# Patient Record
Sex: MALE | Race: WHITE | NOT HISPANIC OR LATINO | URBAN - METROPOLITAN AREA
[De-identification: names, ages, dates, MRNs, and addresses within clinical notes are randomized per-mention and may not be internally consistent; named-entity substitution may affect disease eponyms.]

---

## 2016-07-17 RX ORDER — ROSUVASTATIN CALCIUM 5 MG/1
0 TABLET ORAL
Qty: 90 | Refills: 0 | COMMUNITY
Start: 2016-07-17

## 2016-11-13 RX ORDER — METOPROLOL TARTRATE 50 MG
0 TABLET ORAL
Qty: 30 | Refills: 0 | COMMUNITY
Start: 2016-11-13

## 2017-08-03 RX ORDER — LOSARTAN POTASSIUM 100 MG/1
0 TABLET, FILM COATED ORAL
Qty: 30 | Refills: 0 | COMMUNITY
Start: 2017-08-03

## 2017-08-13 RX ORDER — LOSARTAN POTASSIUM 100 MG/1
0 TABLET, FILM COATED ORAL
Qty: 30 | Refills: 0 | COMMUNITY
Start: 2017-08-13

## 2017-08-20 ENCOUNTER — EMERGENCY (EMERGENCY)
Facility: HOSPITAL | Age: 62
LOS: 1 days | Discharge: HOME | End: 2017-08-20
Attending: EMERGENCY MEDICINE
Payer: COMMERCIAL

## 2017-08-20 VITALS
OXYGEN SATURATION: 98 % | RESPIRATION RATE: 16 BRPM | DIASTOLIC BLOOD PRESSURE: 78 MMHG | SYSTOLIC BLOOD PRESSURE: 135 MMHG | HEART RATE: 58 BPM | WEIGHT: 173.94 LBS | HEIGHT: 70 IN | TEMPERATURE: 98 F
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
## 2017-08-20 DIAGNOSIS — Z88.0 ALLERGY STATUS TO PENICILLIN: ICD-10-CM

## 2017-08-20 DIAGNOSIS — S51.011A LACERATION WITHOUT FOREIGN BODY OF RIGHT ELBOW, INITIAL ENCOUNTER: ICD-10-CM

## 2017-08-20 DIAGNOSIS — M70.21 OLECRANON BURSITIS, RIGHT ELBOW: ICD-10-CM
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
## 2017-08-20 PROCEDURE — 99283 EMERGENCY DEPT VISIT LOW MDM: CPT | Mod: 25

## 2017-08-20 PROCEDURE — 99284 EMERGENCY DEPT VISIT MOD MDM: CPT | Mod: 25

## 2017-08-20 PROCEDURE — 90715 TDAP VACCINE 7 YRS/> IM: CPT

## 2017-08-20 PROCEDURE — 73070 X-RAY EXAM OF ELBOW: CPT

## 2017-08-20 PROCEDURE — 73070 X-RAY EXAM OF ELBOW: CPT | Mod: 26,RT

## 2017-08-20 PROCEDURE — 90471 IMMUNIZATION ADMIN: CPT

## 2017-08-20 PROCEDURE — 12001 RPR S/N/AX/GEN/TRNK 2.5CM/<: CPT | Mod: LT

## 2017-08-20 PROCEDURE — 12001 RPR S/N/AX/GEN/TRNK 2.5CM/<: CPT

## 2017-08-20 RX ORDER — TETANUS TOXOID, REDUCED DIPHTHERIA TOXOID AND ACELLULAR PERTUSSIS VACCINE, ADSORBED 5; 2.5; 8; 8; 2.5 [IU]/.5ML; [IU]/.5ML; UG/.5ML; UG/.5ML; UG/.5ML
0.5 SUSPENSION INTRAMUSCULAR ONCE
Qty: 0 | Refills: 0 | Status: COMPLETED | OUTPATIENT
Start: 2017-08-20 | End: 2017-08-20

## 2017-08-20 RX ADMIN — TETANUS TOXOID, REDUCED DIPHTHERIA TOXOID AND ACELLULAR PERTUSSIS VACCINE, ADSORBED 0.5 MILLILITER(S): 5; 2.5; 8; 8; 2.5 SUSPENSION INTRAMUSCULAR at 15:25

## 2017-08-20 NOTE — ED PROVIDER NOTE - CARE PLAN
Principal Discharge DX:	Elbow laceration, right, initial encounter  Secondary Diagnosis:	Olecranon bursitis of right elbow

## 2017-08-20 NOTE — ED PROVIDER NOTE - OBJECTIVE STATEMENT
60 y/o male with no significant PMHx presents to the ED c/o R elbow laceration x today. Pt notes he was carrying his bicycle down the stairs when he tripped and fell, landing on his R elbow. Pt denies vomiting, LOC, weakness, numbness, tingling, or any other complaints. Pt also denies any other injuries. Tdap not UTD. Pt allergic to Penicillin (unknown).

## 2017-08-20 NOTE — ED ADULT NURSE NOTE - OBJECTIVE STATEMENT
pt from home c/o of Rt elbow pain with laceration s/p fell off stairs carrying a bike pt is alert awake oriented x3 Rt elbow positive ROM with small laceration and small bleeding at this time

## 2017-08-20 NOTE — ED PROVIDER NOTE - MEDICAL DECISION MAKING DETAILS
60 y/o male presents to the ED c/o R elbow laceration s/p mechanical fall x today.  PLAN: Xray, wound repair and update Tdap.

## 2017-08-20 NOTE — ED PROVIDER NOTE - MUSCULOSKELETAL, MLM
Spine appears normal, range of motion is not limited, no muscle or joint tenderness. Jaydon prominence palpated. Nontender except R elbow, full ROM

## 2017-08-26 ENCOUNTER — EMERGENCY (EMERGENCY)
Facility: HOSPITAL | Age: 62
LOS: 1 days | Discharge: HOME | End: 2017-08-26
Attending: EMERGENCY MEDICINE
Payer: COMMERCIAL

## 2017-08-26 VITALS
WEIGHT: 175.05 LBS | RESPIRATION RATE: 16 BRPM | OXYGEN SATURATION: 97 % | TEMPERATURE: 99 F | SYSTOLIC BLOOD PRESSURE: 128 MMHG | HEART RATE: 68 BPM | HEIGHT: 70 IN | DIASTOLIC BLOOD PRESSURE: 74 MMHG

## 2017-08-26 VITALS
SYSTOLIC BLOOD PRESSURE: 142 MMHG | OXYGEN SATURATION: 99 % | TEMPERATURE: 99 F | RESPIRATION RATE: 16 BRPM | HEART RATE: 71 BPM | DIASTOLIC BLOOD PRESSURE: 78 MMHG

## 2017-08-26 DIAGNOSIS — Z95.5 PRESENCE OF CORONARY ANGIOPLASTY IMPLANT AND GRAFT: Chronic | ICD-10-CM

## 2017-08-26 DIAGNOSIS — L02.413 CUTANEOUS ABSCESS OF RIGHT UPPER LIMB: ICD-10-CM

## 2017-08-26 DIAGNOSIS — Z88.0 ALLERGY STATUS TO PENICILLIN: ICD-10-CM

## 2017-08-26 DIAGNOSIS — I25.10 ATHEROSCLEROTIC HEART DISEASE OF NATIVE CORONARY ARTERY WITHOUT ANGINA PECTORIS: ICD-10-CM

## 2017-08-26 DIAGNOSIS — Z95.5 PRESENCE OF CORONARY ANGIOPLASTY IMPLANT AND GRAFT: ICD-10-CM

## 2017-08-26 PROCEDURE — 87205 SMEAR GRAM STAIN: CPT

## 2017-08-26 PROCEDURE — 87070 CULTURE OTHR SPECIMN AEROBIC: CPT

## 2017-08-26 PROCEDURE — 99284 EMERGENCY DEPT VISIT MOD MDM: CPT | Mod: 25

## 2017-08-26 PROCEDURE — 10060 I&D ABSCESS SIMPLE/SINGLE: CPT | Mod: RT

## 2017-08-26 PROCEDURE — 99283 EMERGENCY DEPT VISIT LOW MDM: CPT | Mod: 25

## 2017-08-26 PROCEDURE — 10060 I&D ABSCESS SIMPLE/SINGLE: CPT

## 2017-08-26 RX ADMIN — Medication 100 MILLIGRAM(S): at 16:02

## 2017-08-26 NOTE — ED PROVIDER NOTE - MEDICAL DECISION MAKING DETAILS
60 y/o M pt w/ R elbow swelling s/p suture placement 5 days ago. Will do I&D, suture removal, f/u w/ dermatology

## 2017-08-26 NOTE — ED ADULT TRIAGE NOTE - CHIEF COMPLAINT QUOTE
c/o right arm pain. " I had stiches placed in my elbow 5 days ago and now my arm is red, swollen and painful.

## 2017-08-26 NOTE — ED PROVIDER NOTE - OBJECTIVE STATEMENT
62 y/o M pt w/ PMHx of CAD w/ stents presents to ED c/o R elbow swelling x5 days. Pt states that he had 2 stitches placed in his R elbow 5 days ago in [facility]. Pt noticed some swelling w/ erythema, tenderness, fluctuance and mild induration to his R elbow. Pt denies fever, chills, or any other complaints. Pt has full ROM of his R elbow and is only c/o mild soreness. Pt is allergic to Penicillin (Unknown).

## 2017-08-27 ENCOUNTER — INPATIENT (INPATIENT)
Facility: HOSPITAL | Age: 62
LOS: 2 days | Discharge: HOME | DRG: 501 | End: 2017-08-30
Attending: INTERNAL MEDICINE | Admitting: INTERNAL MEDICINE
Payer: COMMERCIAL

## 2017-08-27 VITALS
WEIGHT: 175.05 LBS | HEART RATE: 98 BPM | OXYGEN SATURATION: 97 % | HEIGHT: 70 IN | TEMPERATURE: 98 F | RESPIRATION RATE: 17 BRPM | DIASTOLIC BLOOD PRESSURE: 86 MMHG | SYSTOLIC BLOOD PRESSURE: 144 MMHG

## 2017-08-27 DIAGNOSIS — Z29.9 ENCOUNTER FOR PROPHYLACTIC MEASURES, UNSPECIFIED: ICD-10-CM

## 2017-08-27 DIAGNOSIS — I10 ESSENTIAL (PRIMARY) HYPERTENSION: ICD-10-CM

## 2017-08-27 DIAGNOSIS — I25.10 ATHEROSCLEROTIC HEART DISEASE OF NATIVE CORONARY ARTERY WITHOUT ANGINA PECTORIS: ICD-10-CM

## 2017-08-27 DIAGNOSIS — N17.9 ACUTE KIDNEY FAILURE, UNSPECIFIED: ICD-10-CM

## 2017-08-27 DIAGNOSIS — L02.91 CUTANEOUS ABSCESS, UNSPECIFIED: ICD-10-CM

## 2017-08-27 DIAGNOSIS — Z95.5 PRESENCE OF CORONARY ANGIOPLASTY IMPLANT AND GRAFT: Chronic | ICD-10-CM

## 2017-08-27 LAB
ALBUMIN SERPL ELPH-MCNC: 3.5 G/DL — SIGNIFICANT CHANGE UP (ref 3.5–5)
ALP SERPL-CCNC: 79 U/L — SIGNIFICANT CHANGE UP (ref 40–120)
ALT FLD-CCNC: 35 U/L DA — SIGNIFICANT CHANGE UP (ref 10–60)
ANION GAP SERPL CALC-SCNC: 8 MMOL/L — SIGNIFICANT CHANGE UP (ref 5–17)
APTT BLD: 30.9 SEC — SIGNIFICANT CHANGE UP (ref 27.5–37.4)
AST SERPL-CCNC: 21 U/L — SIGNIFICANT CHANGE UP (ref 10–40)
BASOPHILS # BLD AUTO: 0.1 K/UL — SIGNIFICANT CHANGE UP (ref 0–0.2)
BASOPHILS NFR BLD AUTO: 1.1 % — SIGNIFICANT CHANGE UP (ref 0–2)
BILIRUB SERPL-MCNC: 0.5 MG/DL — SIGNIFICANT CHANGE UP (ref 0.2–1.2)
BUN SERPL-MCNC: 18 MG/DL — SIGNIFICANT CHANGE UP (ref 7–18)
CALCIUM SERPL-MCNC: 8.9 MG/DL — SIGNIFICANT CHANGE UP (ref 8.4–10.5)
CHLORIDE SERPL-SCNC: 110 MMOL/L — HIGH (ref 96–108)
CO2 SERPL-SCNC: 26 MMOL/L — SIGNIFICANT CHANGE UP (ref 22–31)
CREAT SERPL-MCNC: 1.42 MG/DL — HIGH (ref 0.5–1.3)
CRP SERPL-MCNC: 9.8 MG/DL — HIGH (ref 0–0.4)
EOSINOPHIL # BLD AUTO: 0.2 K/UL — SIGNIFICANT CHANGE UP (ref 0–0.5)
EOSINOPHIL NFR BLD AUTO: 1.6 % — SIGNIFICANT CHANGE UP (ref 0–6)
ERYTHROCYTE [SEDIMENTATION RATE] IN BLOOD: 39 MM/HR — HIGH (ref 0–20)
GLUCOSE SERPL-MCNC: 94 MG/DL — SIGNIFICANT CHANGE UP (ref 70–99)
HCT VFR BLD CALC: 43.6 % — SIGNIFICANT CHANGE UP (ref 39–50)
HGB BLD-MCNC: 13.9 G/DL — SIGNIFICANT CHANGE UP (ref 13–17)
INR BLD: 1.13 RATIO — SIGNIFICANT CHANGE UP (ref 0.88–1.16)
LACTATE SERPL-SCNC: 0.7 MMOL/L — SIGNIFICANT CHANGE UP (ref 0.7–2)
LYMPHOCYTES # BLD AUTO: 1.7 K/UL — SIGNIFICANT CHANGE UP (ref 1–3.3)
LYMPHOCYTES # BLD AUTO: 15.4 % — SIGNIFICANT CHANGE UP (ref 13–44)
MCHC RBC-ENTMCNC: 29.3 PG — SIGNIFICANT CHANGE UP (ref 27–34)
MCHC RBC-ENTMCNC: 31.9 GM/DL — LOW (ref 32–36)
MCV RBC AUTO: 92 FL — SIGNIFICANT CHANGE UP (ref 80–100)
MONOCYTES # BLD AUTO: 1.3 K/UL — HIGH (ref 0–0.9)
MONOCYTES NFR BLD AUTO: 11.5 % — SIGNIFICANT CHANGE UP (ref 2–14)
NEUTROPHILS # BLD AUTO: 7.9 K/UL — HIGH (ref 1.8–7.4)
NEUTROPHILS NFR BLD AUTO: 70.4 % — SIGNIFICANT CHANGE UP (ref 43–77)
PLATELET # BLD AUTO: 183 K/UL — SIGNIFICANT CHANGE UP (ref 150–400)
POTASSIUM SERPL-MCNC: 3.8 MMOL/L — SIGNIFICANT CHANGE UP (ref 3.5–5.3)
POTASSIUM SERPL-SCNC: 3.8 MMOL/L — SIGNIFICANT CHANGE UP (ref 3.5–5.3)
PROT SERPL-MCNC: 7.6 G/DL — SIGNIFICANT CHANGE UP (ref 6–8.3)
PROTHROM AB SERPL-ACNC: 12.4 SEC — SIGNIFICANT CHANGE UP (ref 9.8–12.7)
RBC # BLD: 4.75 M/UL — SIGNIFICANT CHANGE UP (ref 4.2–5.8)
RBC # FLD: 12.2 % — SIGNIFICANT CHANGE UP (ref 10.3–14.5)
SODIUM SERPL-SCNC: 144 MMOL/L — SIGNIFICANT CHANGE UP (ref 135–145)
WBC # BLD: 11.2 K/UL — HIGH (ref 3.8–10.5)
WBC # FLD AUTO: 11.2 K/UL — HIGH (ref 3.8–10.5)

## 2017-08-27 PROCEDURE — 99285 EMERGENCY DEPT VISIT HI MDM: CPT

## 2017-08-27 PROCEDURE — 71010: CPT | Mod: 26

## 2017-08-27 RX ORDER — LOSARTAN POTASSIUM 100 MG/1
75 TABLET, FILM COATED ORAL AT BEDTIME
Qty: 0 | Refills: 0 | Status: DISCONTINUED | OUTPATIENT
Start: 2017-08-27 | End: 2017-08-30

## 2017-08-27 RX ORDER — METOPROLOL TARTRATE 50 MG
25 TABLET ORAL DAILY
Qty: 0 | Refills: 0 | Status: DISCONTINUED | OUTPATIENT
Start: 2017-08-27 | End: 2017-08-30

## 2017-08-27 RX ORDER — LOSARTAN POTASSIUM 100 MG/1
75 TABLET, FILM COATED ORAL DAILY
Qty: 0 | Refills: 0 | Status: DISCONTINUED | OUTPATIENT
Start: 2017-08-27 | End: 2017-08-27

## 2017-08-27 RX ORDER — VANCOMYCIN HCL 1 G
1000 VIAL (EA) INTRAVENOUS ONCE
Qty: 0 | Refills: 0 | Status: COMPLETED | OUTPATIENT
Start: 2017-08-27 | End: 2017-08-27

## 2017-08-27 RX ORDER — SODIUM CHLORIDE 9 MG/ML
1000 INJECTION, SOLUTION INTRAVENOUS
Qty: 0 | Refills: 0 | Status: DISCONTINUED | OUTPATIENT
Start: 2017-08-27 | End: 2017-08-30

## 2017-08-27 RX ORDER — VANCOMYCIN HCL 1 G
1000 VIAL (EA) INTRAVENOUS EVERY 12 HOURS
Qty: 0 | Refills: 0 | Status: DISCONTINUED | OUTPATIENT
Start: 2017-08-27 | End: 2017-08-28

## 2017-08-27 RX ORDER — ATORVASTATIN CALCIUM 80 MG/1
80 TABLET, FILM COATED ORAL AT BEDTIME
Qty: 0 | Refills: 0 | Status: DISCONTINUED | OUTPATIENT
Start: 2017-08-27 | End: 2017-08-30

## 2017-08-27 RX ORDER — AZTREONAM 2 G
2000 VIAL (EA) INJECTION EVERY 8 HOURS
Qty: 0 | Refills: 0 | Status: DISCONTINUED | OUTPATIENT
Start: 2017-08-28 | End: 2017-08-29

## 2017-08-27 RX ORDER — AZTREONAM 2 G
2000 VIAL (EA) INJECTION ONCE
Qty: 0 | Refills: 0 | Status: COMPLETED | OUTPATIENT
Start: 2017-08-27 | End: 2017-08-28

## 2017-08-27 RX ORDER — AZTREONAM 2 G
VIAL (EA) INJECTION
Qty: 0 | Refills: 0 | Status: DISCONTINUED | OUTPATIENT
Start: 2017-08-28 | End: 2017-08-29

## 2017-08-27 RX ADMIN — Medication 250 MILLIGRAM(S): at 18:25

## 2017-08-27 RX ADMIN — SODIUM CHLORIDE 60 MILLILITER(S): 9 INJECTION, SOLUTION INTRAVENOUS at 22:17

## 2017-08-27 NOTE — H&P ADULT - NSHPLABSRESULTS_GEN_ALL_CORE
EKG shows sinus bradycardia at rate of 59 bpm, slight ST depression of around 0.5 mm in inferior leads but not diagnostic for ischemia  WBC mildly elevated at 11.2  ESR at 39 bpm, CRP 9.8  H/H wnl  Lytes wnl  Cr 1.42 - unknown baseline but presumed elevated

## 2017-08-27 NOTE — CONSULT NOTE ADULT - SUBJECTIVE AND OBJECTIVE BOX
HPI: Patient is a 60 y/o Male complaining of pain, swelling and redness in right elbow as well as his arm and forearm area.  	  [place], had sutures placed. Patient was seen yesterday in ED due to pain and swelling at elbow. An aspiration of elbow abscess was done and marcelle pus was noted and sent to culture. Patient was sent home on doxy (pt wi pcn allergy) overnight. Patient noted fever and chills since yesterday till now with pain, redness and swelling from finger tips to axilla. 	    PAST MEDICAL & SURGICAL HISTORY:  CAD (coronary artery disease)  Stented coronary artery    Review of systems: Non Contributory    Allergies: Penicillin     Vital Signs Last 24 Hrs  T(C): 36.7 (27 Aug 2017 17:06), Max: 36.7 (27 Aug 2017 17:06)  T(F): 98 (27 Aug 2017 17:06), Max: 98 (27 Aug 2017 17:06)  HR: 98 (27 Aug 2017 17:06) (98 - 98)  BP: 144/86 (27 Aug 2017 17:06) (144/86 - 144/86)  BP(mean): --  RR: 17 (27 Aug 2017 17:06) (17 - 17)  SpO2: 97% (27 Aug 2017 17:06) (97% - 97%)    Physical Examination:    Musculoskeletal:   Right elbow, forearm, arm area: Erythema and swelling, tenderness to palpation. Palpable abscess in right olecranon area. Decreased range of motion.     Neurovascularly Intact    LABS:                        13.9   11.2  )-----------( 183      ( 27 Aug 2017 17:57 )             43.6     08-27    144  |  110<H>  |  18  ----------------------------<  94  3.8   |  26  |  1.42<H>    Ca    8.9      27 Aug 2017 17:57    TPro  7.6  /  Alb  3.5  /  TBili  0.5  /  DBili  x   /  AST  21  /  ALT  35  /  AlkPhos  79  08-27    PT/INR - ( 27 Aug 2017 18:36 )   PT: 12.4 sec;   INR: 1.13 ratio         PTT - ( 27 Aug 2017 18:36 )  PTT:30.9 sec    RADIOLOGY & ADDITIONAL STUDIES:  Xray Elbow AP + Lateral, Right (08.20.17 @ 15:24)>    EXAM:  ELBOW 2VIEWS RT                          PROCEDURE DATE:  08/20/2017      INTERPRETATION:  History: Fall. Elbow tender to palpation. Laceration.    Findings: Frontal, lateral and oblique projections of the right elbow.    Apparent skinlaceration dorsal elbow noted on the lateral projection. No   evidence of elbow joint effusion. No fracture or dislocation appreciated.    Impression:    No fracture or dislocation right elbow appreciated.      DEVEN CORONADO M.D. ATTENDING RADIOLOGIST  This document has been electronically signed. Aug 20 2017  3:26PM        ASSESSMENT: Right elbow olecranon abscess with cellulitis of forearm.    PLAN/RECOMMENDATION: Patient to undergo I & D of Abscess and excision of infected olecranon bursa once medically cleared for procedure.     Continue IV AB    FOLLOW UP: HPI: Patient is a 60 y/o Male complaining of pain, swelling and redness in right elbow as well as his arm and forearm area.  	  [place], had sutures placed. Patient was seen yesterday in ED due to pain and swelling at elbow. An aspiration of elbow abscess was done and marcelle pus was noted and sent to culture. Patient was sent home on doxy (pt wi pcn allergy) overnight. Patient noted fever and chills since yesterday till now with pain, redness and swelling from finger tips to axilla. 	    PAST MEDICAL & SURGICAL HISTORY:  CAD (coronary artery disease)  Stented coronary artery    Review of systems: Non Contributory    Allergies: Penicillin     Vital Signs Last 24 Hrs  T(C): 36.7 (27 Aug 2017 17:06), Max: 36.7 (27 Aug 2017 17:06)  T(F): 98 (27 Aug 2017 17:06), Max: 98 (27 Aug 2017 17:06)  HR: 98 (27 Aug 2017 17:06) (98 - 98)  BP: 144/86 (27 Aug 2017 17:06) (144/86 - 144/86)  BP(mean): --  RR: 17 (27 Aug 2017 17:06) (17 - 17)  SpO2: 97% (27 Aug 2017 17:06) (97% - 97%)    Physical Examination:    Musculoskeletal:   Right elbow, forearm, arm area: Erythema and swelling, tenderness to palpation. Palpable abscess in right olecranon area. Decreased range of motion.     Neurovascularly Intact    LABS:                        13.9   11.2  )-----------( 183      ( 27 Aug 2017 17:57 )             43.6     08-27    144  |  110<H>  |  18  ----------------------------<  94  3.8   |  26  |  1.42<H>    Ca    8.9      27 Aug 2017 17:57    TPro  7.6  /  Alb  3.5  /  TBili  0.5  /  DBili  x   /  AST  21  /  ALT  35  /  AlkPhos  79  08-27    PT/INR - ( 27 Aug 2017 18:36 )   PT: 12.4 sec;   INR: 1.13 ratio         PTT - ( 27 Aug 2017 18:36 )  PTT:30.9 sec    RADIOLOGY & ADDITIONAL STUDIES:  Xray Elbow AP + Lateral, Right (08.20.17 @ 15:24)>    EXAM:  ELBOW 2VIEWS RT                          PROCEDURE DATE:  08/20/2017      INTERPRETATION:  History: Fall. Elbow tender to palpation. Laceration.    Findings: Frontal, lateral and oblique projections of the right elbow.    Apparent skinlaceration dorsal elbow noted on the lateral projection. No   evidence of elbow joint effusion. No fracture or dislocation appreciated.    Impression:    No fracture or dislocation right elbow appreciated.      DEVEN CORONADO M.D. ATTENDING RADIOLOGIST  This document has been electronically signed. Aug 20 2017  3:26PM        ASSESSMENT: Right elbow olecranon abscess with cellulitis of forearm.    PLAN/RECOMMENDATION: Patient to undergo I & D of Abscess and excision of infected olecranon bursa once medically cleared for procedure.     Continue IV AB    Posterior splint was applied.     FOLLOW UP:

## 2017-08-27 NOTE — H&P ADULT - PROBLEM SELECTOR PLAN 4
C/w home losartan and toprol with parameters  -monitor BP and adjust as necessary to improve bp control

## 2017-08-27 NOTE — H&P ADULT - PROBLEM SELECTOR PLAN 1
Will need antibiotic therapy  -will put on vancomycin and azactam for GP and GN coverage since [place]  -Dr. Cortez consulted for ortho drainage of infection - still pending to see if they need clearance  -will give LR hydration for infection  -ID consult Dr. Hurtado  -cultures taken during previous I&D yesterday are negative so far, f/u BCx and Surg Cx Will need antibiotic therapy  -will put on vancomycin and azactam for GP and GN coverage since [place]  -Dr. Cortez consulted for ortho drainage of infection - still pending to see if they need clearance  -will give LR hydration for infection  -ID consult Dr. Hurtado  -cultures taken during previous I&D yesterday are negative so far, f/u BCx and Surg Cx  -Due to elevated CRP/ESR, will get MRI of right elbow for evaluation of osteo Will need antibiotic therapy  -will put on vancomycin and azactam for GP and GN coverage since [place]  -Dr. Cortez consulted for ortho drainage of infection - still pending to see if they need clearance  -will give LR hydration for infection  -ID consult Dr. Bowman  -cultures taken during previous I&D yesterday are negative so far, f/u BCx and Surg Cx  -Due to elevated CRP/ESR, will get MRI of right elbow for evaluation of osteo

## 2017-08-27 NOTE — H&P ADULT - NSHPREVIEWOFSYSTEMS_GEN_ALL_CORE
REVIEW OF SYSTEMS:  CONSTITUTIONAL: No fever, weight loss, or fatigue  EYES: No eye pain, visual disturbances, or discharge  ENMT:  No difficulty hearing, tinnitus, vertigo; No sinus or throat pain  NECK: No pain or stiffness  RESPIRATORY: No cough, wheezing, chills or hemoptysis; No shortness of breath  CARDIOVASCULAR: No chest pain, palpitations, dizziness, or leg swelling  GASTROINTESTINAL: No abdominal or epigastric pain. No nausea, vomiting, or hematemesis; No diarrhea or constipation. No melena or hematochezia.  GENITOURINARY: No dysuria, frequency, hematuria, or incontinence  NEUROLOGICAL: No headaches, memory loss, loss of strength, numbness, or tremors  SKIN: No itching, burning, rashes, or lesions   LYMPH NODES: No enlarged glands  ENDOCRINE: No heat or cold intolerance; No hair loss  MUSCULOSKELETAL: right elbow pain, erythema and swelling; swelling moved from elbow up to shoulder and down to hand which is also profoundly swollen compared to the left  PSYCHIATRIC: No depression, anxiety, mood swings, or difficulty sleeping  HEME/LYMPH: No easy bruising, or bleeding gums  ALLERY AND IMMUNOLOGIC: No hives or eczema

## 2017-08-27 NOTE — ED ADULT NURSE REASSESSMENT NOTE - NS ED NURSE REASSESS COMMENT FT1
received  pt. from main ed at 2100 from aubrie sheehan. pt. is alert and  oriented x3.denies pain. NPO  maintained for I&D abscess and escision of infected elbow.transfer to surgery at 2320 not  in  distress

## 2017-08-27 NOTE — H&P ADULT - PROBLEM SELECTOR PLAN 2
Patient has RADHA presumably secondary to pre-renal azotemia from losses due to infection  -will hydrate with LR  -f/u AM renal panel repeat

## 2017-08-27 NOTE — H&P ADULT - HISTORY OF PRESENT ILLNESS
61M from home PMH of CAD with 3 stents placed 2 years ago, HTN, HLD presents to hospital with worsening right elbow pain and swelling. Last week (around monday), patient was [place] his [place] [employer] when his foot slipped and he fell and landed on his elbow. He went to the ED at that time and got 2 stitches placed into the elbow. Afterwards, last Friday, he noticed that it was bright red and swollen so he came to the ED the following day. On Saturday, patient was seen in the ED and got a bedside I&D by the ED and sent home on doxycycline. Today, he noticed that the swelling got worse and the erythema extended and wife took his temp at home and it was 99.7 so she told him to come to the ED again.

## 2017-08-27 NOTE — H&P ADULT - NSHPPHYSICALEXAM_GEN_ALL_CORE
Vital Signs Last 24 Hrs  T(C): 36.7 (27 Aug 2017 20:14), Max: 36.7 (27 Aug 2017 17:06)  T(F): 98.1 (27 Aug 2017 20:14), Max: 98.1 (27 Aug 2017 20:14)  HR: 57 (27 Aug 2017 20:14) (57 - 98)  BP: 170/88 (27 Aug 2017 20:14) (144/86 - 170/88)  BP(mean): --  RR: 18 (27 Aug 2017 20:14) (17 - 18)  SpO2: 100% (27 Aug 2017 20:14) (97% - 100%)    GENERAL: NAD, well-groomed, well-developed  HEAD:  Atraumatic, Normocephalic  EYES: EOMI, PERRLA, conjunctiva and sclera clear  ENMT: No tonsillar erythema, exudates, or enlargement; Moist mucous membranes  NECK: Supple, No JVD, Normal thyroid  NERVOUS SYSTEM:  Alert & Oriented X3  CHEST/LUNG: Clear to auscultation bilaterally; No rales, rhonchi, wheezing, or rubs  HEART: Regular rate and rhythm; No murmurs, rubs, or gallops  ABDOMEN: Soft, Nontender, Nondistended; Bowel sounds present  EXTREMITIES:  2+ Peripheral Pulses, No clubbing, cyanosis, or edema in legs. Right arm has erythema from upper arm to hand. Golf-ball like abscess palpated in the right olecranon. Right hand demonstrates significant edema. Elbow is tender to palpation.   LYMPH: No lymphadenopathy noted  SKIN: No rashes or lesions

## 2017-08-27 NOTE — H&P ADULT - ASSESSMENT
61M from home PMH of CAD with 3 stents placed 2 years ago, HTN, HLD presents to hospital with worsening right elbow pain and swelling. Admitted for worsening abscess of the right olecranon compared to 1 day prior which will need further antibiotic and surgical intervention.

## 2017-08-27 NOTE — H&P ADULT - PROBLEM SELECTOR PLAN 3
s/p 3 stents about 2 years ago at [facility]  -c/w ASA, Toprol and Statin (home crestor changed to formulary lipitor here)  -if surg decides that they need clearance, will consult cardio Dr. Kent  -will order echo for evaluation

## 2017-08-28 LAB
ANION GAP SERPL CALC-SCNC: 8 MMOL/L — SIGNIFICANT CHANGE UP (ref 5–17)
BUN SERPL-MCNC: 11 MG/DL — SIGNIFICANT CHANGE UP (ref 7–18)
CALCIUM SERPL-MCNC: 8.9 MG/DL — SIGNIFICANT CHANGE UP (ref 8.4–10.5)
CHLORIDE SERPL-SCNC: 110 MMOL/L — HIGH (ref 96–108)
CHOLEST SERPL-MCNC: 114 MG/DL — SIGNIFICANT CHANGE UP (ref 10–199)
CO2 SERPL-SCNC: 25 MMOL/L — SIGNIFICANT CHANGE UP (ref 22–31)
CREAT SERPL-MCNC: 1.03 MG/DL — SIGNIFICANT CHANGE UP (ref 0.5–1.3)
FOLATE SERPL-MCNC: >20 NG/ML — SIGNIFICANT CHANGE UP (ref 4.8–24.2)
GLUCOSE SERPL-MCNC: 99 MG/DL — SIGNIFICANT CHANGE UP (ref 70–99)
GRAM STN FLD: SIGNIFICANT CHANGE UP
HBA1C BLD-MCNC: 5.4 % — SIGNIFICANT CHANGE UP (ref 4–5.6)
HCT VFR BLD CALC: 43.6 % — SIGNIFICANT CHANGE UP (ref 39–50)
HDLC SERPL-MCNC: 46 MG/DL — SIGNIFICANT CHANGE UP (ref 40–125)
HGB BLD-MCNC: 14 G/DL — SIGNIFICANT CHANGE UP (ref 13–17)
LIPID PNL WITH DIRECT LDL SERPL: 58 MG/DL — SIGNIFICANT CHANGE UP
MAGNESIUM SERPL-MCNC: 2.3 MG/DL — SIGNIFICANT CHANGE UP (ref 1.6–2.6)
MCHC RBC-ENTMCNC: 29.4 PG — SIGNIFICANT CHANGE UP (ref 27–34)
MCHC RBC-ENTMCNC: 32.2 GM/DL — SIGNIFICANT CHANGE UP (ref 32–36)
MCV RBC AUTO: 91.3 FL — SIGNIFICANT CHANGE UP (ref 80–100)
PHOSPHATE SERPL-MCNC: 2.9 MG/DL — SIGNIFICANT CHANGE UP (ref 2.5–4.5)
PLATELET # BLD AUTO: 172 K/UL — SIGNIFICANT CHANGE UP (ref 150–400)
POTASSIUM SERPL-MCNC: 4 MMOL/L — SIGNIFICANT CHANGE UP (ref 3.5–5.3)
POTASSIUM SERPL-SCNC: 4 MMOL/L — SIGNIFICANT CHANGE UP (ref 3.5–5.3)
RBC # BLD: 4.78 M/UL — SIGNIFICANT CHANGE UP (ref 4.2–5.8)
RBC # FLD: 12.3 % — SIGNIFICANT CHANGE UP (ref 10.3–14.5)
SODIUM SERPL-SCNC: 143 MMOL/L — SIGNIFICANT CHANGE UP (ref 135–145)
SPECIMEN SOURCE: SIGNIFICANT CHANGE UP
TOTAL CHOLESTEROL/HDL RATIO MEASUREMENT: 2.5 RATIO — LOW (ref 3.4–9.6)
TRIGL SERPL-MCNC: 49 MG/DL — SIGNIFICANT CHANGE UP (ref 10–149)
TSH SERPL-MCNC: 3.48 UU/ML — SIGNIFICANT CHANGE UP (ref 0.34–4.82)
VIT B12 SERPL-MCNC: 443 PG/ML — SIGNIFICANT CHANGE UP (ref 243–894)
WBC # BLD: 9.2 K/UL — SIGNIFICANT CHANGE UP (ref 3.8–10.5)
WBC # FLD AUTO: 9.2 K/UL — SIGNIFICANT CHANGE UP (ref 3.8–10.5)

## 2017-08-28 RX ORDER — VANCOMYCIN HCL 1 G
1250 VIAL (EA) INTRAVENOUS EVERY 12 HOURS
Qty: 0 | Refills: 0 | Status: DISCONTINUED | OUTPATIENT
Start: 2017-08-28 | End: 2017-08-30

## 2017-08-28 RX ORDER — HYDROMORPHONE HYDROCHLORIDE 2 MG/ML
0.5 INJECTION INTRAMUSCULAR; INTRAVENOUS; SUBCUTANEOUS
Qty: 0 | Refills: 0 | Status: DISCONTINUED | OUTPATIENT
Start: 2017-08-28 | End: 2017-08-28

## 2017-08-28 RX ORDER — ENOXAPARIN SODIUM 100 MG/ML
40 INJECTION SUBCUTANEOUS EVERY 24 HOURS
Qty: 0 | Refills: 0 | Status: DISCONTINUED | OUTPATIENT
Start: 2017-08-28 | End: 2017-08-30

## 2017-08-28 RX ORDER — ONDANSETRON 8 MG/1
4 TABLET, FILM COATED ORAL ONCE
Qty: 0 | Refills: 0 | Status: DISCONTINUED | OUTPATIENT
Start: 2017-08-28 | End: 2017-08-28

## 2017-08-28 RX ORDER — KETOROLAC TROMETHAMINE 30 MG/ML
15 SYRINGE (ML) INJECTION ONCE
Qty: 0 | Refills: 0 | Status: DISCONTINUED | OUTPATIENT
Start: 2017-08-28 | End: 2017-08-28

## 2017-08-28 RX ADMIN — ENOXAPARIN SODIUM 40 MILLIGRAM(S): 100 INJECTION SUBCUTANEOUS at 05:48

## 2017-08-28 RX ADMIN — Medication 100 MILLIGRAM(S): at 08:54

## 2017-08-28 RX ADMIN — Medication 100 MILLIGRAM(S): at 02:13

## 2017-08-28 RX ADMIN — Medication 15 MILLIGRAM(S): at 03:54

## 2017-08-28 RX ADMIN — LOSARTAN POTASSIUM 75 MILLIGRAM(S): 100 TABLET, FILM COATED ORAL at 22:04

## 2017-08-28 RX ADMIN — Medication 25 MILLIGRAM(S): at 05:48

## 2017-08-28 RX ADMIN — Medication 15 MILLIGRAM(S): at 04:07

## 2017-08-28 RX ADMIN — Medication 100 MILLIGRAM(S): at 22:04

## 2017-08-28 RX ADMIN — ATORVASTATIN CALCIUM 80 MILLIGRAM(S): 80 TABLET, FILM COATED ORAL at 22:04

## 2017-08-28 RX ADMIN — Medication 250 MILLIGRAM(S): at 05:48

## 2017-08-28 RX ADMIN — Medication 100 MILLIGRAM(S): at 13:52

## 2017-08-28 RX ADMIN — Medication 166.67 MILLIGRAM(S): at 19:06

## 2017-08-28 NOTE — PROGRESS NOTE ADULT - SUBJECTIVE AND OBJECTIVE BOX
60 y/o M s/p I&D of MAGDALENO murphy POD#0. Pt notes improvement in his condition. Denies any fever/chills/numbness/tinlging/CP/SOB. Pt denies any overnight events.     MEDICATIONS  (STANDING):  lactated ringers. 1000 milliLiter(s) IV Continuous <Continuous>  atorvastatin 80 milliGRAM(s) Oral at bedtime  metoprolol succinate ER 25 milliGRAM(s) Oral daily  losartan 75 milliGRAM(s) Oral at bedtime  aztreonam  IVPB   IV Intermittent   vancomycin  IVPB 1000 milliGRAM(s) IV Intermittent every 12 hours  aztreonam  IVPB 2000 milliGRAM(s) IV Intermittent every 8 hours  enoxaparin Injectable 40 milliGRAM(s) SubCutaneous every 24 hours      Vital Signs Last 24 Hrs  T(C): 36.4 (08-28-17 @ 06:39), Max: 36.8 (08-28-17 @ 01:41)  T(F): 97.6 (08-28-17 @ 06:39), Max: 98.3 (08-28-17 @ 01:41)  HR: 55 (08-28-17 @ 06:39) (55 - 98)  BP: 136/60 (08-28-17 @ 06:39) (136/60 - 171/71)  BP(mean): 100 (08-28-17 @ 01:41) (95 - 104)  RR: 16 (08-28-17 @ 06:39) (13 - 18)  SpO2: 99% (08-28-17 @ 06:39) (97% - 100%)      Physical Exam  Gen: NAD  RUE: In posterior splint and elevation pillow- intact, Dressing- C/D/I. Moves all fingers without pain, minimal swelling to fingers, BCR, SILT.                         13.9   11.2  )-----------( 183      ( 27 Aug 2017 17:57 )             43.6     27 Aug 2017 17:57    144    |  110    |  18     ----------------------------<  94     3.8     |  26     |  1.42     Ca    8.9        27 Aug 2017 17:57    TPro  7.6    /  Alb  3.5    /  TBili  0.5    /  DBili  x      /  AST  21     /  ALT  35     /  AlkPhos  79     27 Aug 2017 17:57    PT/INR - ( 27 Aug 2017 18:36 )   PT: 12.4 sec;   INR: 1.13 ratio         PTT - ( 27 Aug 2017 18:36 )  PTT:30.9 sec    A/P: 61y Male S/p I&D of R Olecranon POD#0  -Pain control  -NWB RUE in splint, keep c/d/I  -Ice/elevation pillow  -Active movement of fingers encouraged  - Incentive spirometer  -C/w Antibx  -F/u intraop cultures  -D/w  - will follow pt  -D/w

## 2017-08-28 NOTE — CONSULT NOTE ADULT - CONSULT REQUESTED DATE/TIME
27-Aug-2017 20:11
28-Aug-2017 12:09
Gen: NAD; well-appearing  HEENT: NC/AT; AFOF; ears and nose clinically patent, normally set; no tags  Skin: pink, warm, well-perfused, no rash  Resp: CTAB, even, non-labored breathing  Cardiac: RRR, normal S1 and S2; no murmurs; 2+ femoral pulses b/l  Abd: soft, NT/ND; +BS; no HSM  Extremities: FROM; no crepitus; Hips: negative O/B  : Clement I; no abnormalities; no hernia; anus patent  Neuro: +manjeet, suck, grasp, Babinski; slightly decreased tone

## 2017-08-28 NOTE — CONSULT NOTE ADULT - ASSESSMENT
right elbow bursitis - s/p I&D and bursectomy  cellulitis of right arm from shoulder to forearm  fever  plan - increase vanco to 1.25gms iv q12hrs  cont azactam 2 gms iv q 8hrs for today

## 2017-08-28 NOTE — CONSULT NOTE ADULT - SUBJECTIVE AND OBJECTIVE BOX
HPI:  61M from home PMH of CAD with 3 stents placed 2 years ago, HTN, HLD presents to hospital with worsening right elbow pain and swelling. Last week (around monday), patient was carrying his bike [place] the stairs to his [employer] when his foot slipped and he fell and landed on his elbow. He went to the ED at that time and got 2 stitches placed into the elbow. Afterwards, last Friday, he noticed that it was bright red and swollen so he came to the ED the following day. On Saturday, patient was seen in the ED and got a bedside I&D by the ED and sent home on doxycycline. Today, he noticed that the swelling got worse and the erythema extended and wife took his temp at home and it was 99.7 so she told him to come to the ED again. Pt had I&D of right elbow last night in OR.      PAST MEDICAL & SURGICAL HISTORY:  HTN (hypertension)  CAD (coronary artery disease)  Stented coronary artery      penicillin (Unknown)      Meds:  lactated ringers. 1000 milliLiter(s) IV Continuous <Continuous>  atorvastatin 80 milliGRAM(s) Oral at bedtime  metoprolol succinate ER 25 milliGRAM(s) Oral daily  losartan 75 milliGRAM(s) Oral at bedtime  aztreonam  IVPB   IV Intermittent   vancomycin  IVPB 1000 milliGRAM(s) IV Intermittent every 12 hours  aztreonam  IVPB 2000 milliGRAM(s) IV Intermittent every 8 hours  enoxaparin Injectable 40 milliGRAM(s) SubCutaneous every 24 hours      SOCIAL HISTORY:  Smoker:  YES / NO      ETOH use:  YES / NO                Ilicit Drug use:  YES / NO    FAMILY HISTORY:      VITALS:  Vital Signs Last 24 Hrs  T(C): 36.4 (28 Aug 2017 06:39), Max: 36.8 (28 Aug 2017 01:41)  T(F): 97.6 (28 Aug 2017 06:39), Max: 98.3 (28 Aug 2017 01:41)  HR: 55 (28 Aug 2017 06:39) (55 - 98)  BP: 136/60 (28 Aug 2017 06:39) (136/60 - 171/71)  BP(mean): 100 (28 Aug 2017 01:41) (95 - 104)  RR: 16 (28 Aug 2017 06:39) (13 - 18)  SpO2: 99% (28 Aug 2017 06:39) (97% - 100%)    LABS/DIAGNOSTIC TESTS:                          14.0   9.2   )-----------( 172      ( 28 Aug 2017 11:06 )             43.6     WBC Count: 9.2 K/uL (08-28 @ 11:06)  WBC Count: 11.2 K/uL (08-27 @ 17:57)      08-28    143  |  110<H>  |  11  ----------------------------<  99  4.0   |  25  |  1.03    Ca    8.9      28 Aug 2017 11:06  Phos  2.9     08-28  Mg     2.3     08-28    TPro  7.6  /  Alb  3.5  /  TBili  0.5  /  DBili  x   /  AST  21  /  ALT  35  /  AlkPhos  79  08-27          LIVER FUNCTIONS - ( 27 Aug 2017 17:57 )  Alb: 3.5 g/dL / Pro: 7.6 g/dL / ALK PHOS: 79 U/L / ALT: 35 U/L DA / AST: 21 U/L / GGT: x             PT/INR - ( 27 Aug 2017 18:36 )   PT: 12.4 sec;   INR: 1.13 ratio         PTT - ( 27 Aug 2017 18:36 )  PTT:30.9 sec    LACTATE:Lactate, Blood: 0.7 mmol/L (08-27 @ 18:36)      ABG -     CULTURES: Culture - Abscess with Gram Stain (08.26.17 @ 21:35)    Specimen Source: .Abscess Right elbow    Culture Results:   No growth    Culture - Abscess with Gram Stain (08.26.17 @ 21:35)    Specimen Source: .Abscess Right elbow    Culture Results:   No growth          RADIOLOGY:< from: Xray Chest 1 View AP- PORTABLE-Urgent (08.27.17 @ 19:07) >  EXAM:  CHEST-PORTABLE URGENT                            PROCEDURE DATE:  08/27/2017          INTERPRETATION:  INDICATION: Preoperative testing    PRIORS: None    VIEWS: Portable AP radiography of the chest performed.    FINDINGS: Heart size appearswithin normal limits. No hilar or superior   mediastinal abnormalities are identified. There is no evidence for focal   infiltrate, lobar consolidation or pulmonary edema. No pleural effusion   or pneumothorax is demonstrated. No mediastinal shift isnoted. Chronic   deformity of the left clavicle noted.    IMPRESSION: No evidence for focal infiltrate or lobar consolidation.      < end of copied text >    < from: Xray Elbow AP + Lateral, Right (08.20.17 @ 15:24) >  EXAM:  ELBOW 2VIEWS RT                            PROCEDURE DATE:  08/20/2017          INTERPRETATION:  History: Fall. Elbow tender to palpation. Laceration.    Findings: Frontal, lateral and oblique projections of the right elbow.    Apparent skinlaceration dorsal elbow noted on the lateral projection. No   evidence of elbow joint effusion. No fracture or dislocation appreciated.    Impression:    No fracture or dislocation right elbow appreciated.          < end of copied text >            ROS  [  ] UNABLE TO ELICIT HPI:  61M from home PMH of CAD with 3 stents placed 2 years ago, HTN, HLD presents to hospital with worsening right elbow pain and swelling. Last week (around monday), patient was carrying his bike [place] the stairs [place] his [employer] when his foot slipped and he fell and landed on his elbow. He went to the ED at that time and got 2 stitches placed into the elbow. Afterwards, last Friday, he noticed that it was bright red and swollen so he came to the ED the following day. On Saturday, patient was seen in the ED and got a bedside I&D by the ED and sent home on doxycycline. Today, he noticed that the swelling got worse and the erythema extended and wife took his temp at home and it was 99.7 so she told him to come to the ED again. Pt had I&D of right elbow last night in OR. Pt has no significant right elbow pain.      PAST MEDICAL & SURGICAL HISTORY:  HTN (hypertension)  CAD (coronary artery disease)  Stented coronary artery      penicillin (Unknown)      Meds:  lactated ringers. 1000 milliLiter(s) IV Continuous <Continuous>  atorvastatin 80 milliGRAM(s) Oral at bedtime  metoprolol succinate ER 25 milliGRAM(s) Oral daily  losartan 75 milliGRAM(s) Oral at bedtime  aztreonam  IVPB   IV Intermittent   vancomycin  IVPB 1000 milliGRAM(s) IV Intermittent every 12 hours  aztreonam  IVPB 2000 milliGRAM(s) IV Intermittent every 8 hours  enoxaparin Injectable 40 milliGRAM(s) SubCutaneous every 24 hours      SOCIAL HISTORY:  Smoker:  NO      ETOH use:  YES , socially               Ilicit Drug use:   NO    FAMILY HISTORY: CAD      VITALS:  Vital Signs Last 24 Hrs  T(C): 36.4 (28 Aug 2017 06:39), Max: 36.8 (28 Aug 2017 01:41)  T(F): 97.6 (28 Aug 2017 06:39), Max: 98.3 (28 Aug 2017 01:41)  HR: 55 (28 Aug 2017 06:39) (55 - 98)  BP: 136/60 (28 Aug 2017 06:39) (136/60 - 171/71)  BP(mean): 100 (28 Aug 2017 01:41) (95 - 104)  RR: 16 (28 Aug 2017 06:39) (13 - 18)  SpO2: 99% (28 Aug 2017 06:39) (97% - 100%)    LABS/DIAGNOSTIC TESTS:                          14.0   9.2   )-----------( 172      ( 28 Aug 2017 11:06 )             43.6     WBC Count: 9.2 K/uL (08-28 @ 11:06)  WBC Count: 11.2 K/uL (08-27 @ 17:57)      08-28    143  |  110<H>  |  11  ----------------------------<  99  4.0   |  25  |  1.03    Ca    8.9      28 Aug 2017 11:06  Phos  2.9     08-28  Mg     2.3     08-28    TPro  7.6  /  Alb  3.5  /  TBili  0.5  /  DBili  x   /  AST  21  /  ALT  35  /  AlkPhos  79  08-27          LIVER FUNCTIONS - ( 27 Aug 2017 17:57 )  Alb: 3.5 g/dL / Pro: 7.6 g/dL / ALK PHOS: 79 U/L / ALT: 35 U/L DA / AST: 21 U/L / GGT: x             PT/INR - ( 27 Aug 2017 18:36 )   PT: 12.4 sec;   INR: 1.13 ratio         PTT - ( 27 Aug 2017 18:36 )  PTT:30.9 sec    LACTATE:Lactate, Blood: 0.7 mmol/L (08-27 @ 18:36)      ABG -     CULTURES: Culture - Abscess with Gram Stain (08.26.17 @ 21:35)    Specimen Source: .Abscess Right elbow    Culture Results:   No growth    Culture - Abscess with Gram Stain (08.26.17 @ 21:35)    Specimen Source: .Abscess Right elbow    Culture Results:   No growth          RADIOLOGY:< from: Xray Chest 1 View AP- PORTABLE-Urgent (08.27.17 @ 19:07) >  EXAM:  CHEST-PORTABLE URGENT                            PROCEDURE DATE:  08/27/2017          INTERPRETATION:  INDICATION: Preoperative testing    PRIORS: None    VIEWS: Portable AP radiography of the chest performed.    FINDINGS: Heart size appearswithin normal limits. No hilar or superior   mediastinal abnormalities are identified. There is no evidence for focal   infiltrate, lobar consolidation or pulmonary edema. No pleural effusion   or pneumothorax is demonstrated. No mediastinal shift isnoted. Chronic   deformity of the left clavicle noted.    IMPRESSION: No evidence for focal infiltrate or lobar consolidation.      < end of copied text >    < from: Xray Elbow AP + Lateral, Right (08.20.17 @ 15:24) >  EXAM:  ELBOW 2VIEWS RT                            PROCEDURE DATE:  08/20/2017          INTERPRETATION:  History: Fall. Elbow tender to palpation. Laceration.    Findings: Frontal, lateral and oblique projections of the right elbow.    Apparent skinlaceration dorsal elbow noted on the lateral projection. No   evidence of elbow joint effusion. No fracture or dislocation appreciated.    Impression:    No fracture or dislocation right elbow appreciated.          < end of copied text >            ROS  [  ] UNABLE TO ELICIT

## 2017-08-29 RX ADMIN — ENOXAPARIN SODIUM 40 MILLIGRAM(S): 100 INJECTION SUBCUTANEOUS at 05:43

## 2017-08-29 RX ADMIN — ATORVASTATIN CALCIUM 80 MILLIGRAM(S): 80 TABLET, FILM COATED ORAL at 21:17

## 2017-08-29 RX ADMIN — LOSARTAN POTASSIUM 75 MILLIGRAM(S): 100 TABLET, FILM COATED ORAL at 21:17

## 2017-08-29 RX ADMIN — SODIUM CHLORIDE 60 MILLILITER(S): 9 INJECTION, SOLUTION INTRAVENOUS at 20:57

## 2017-08-29 RX ADMIN — Medication 25 MILLIGRAM(S): at 05:43

## 2017-08-29 RX ADMIN — Medication 166.67 MILLIGRAM(S): at 05:43

## 2017-08-29 RX ADMIN — Medication 100 MILLIGRAM(S): at 05:42

## 2017-08-29 RX ADMIN — Medication 166.67 MILLIGRAM(S): at 18:15

## 2017-08-29 RX ADMIN — SODIUM CHLORIDE 60 MILLILITER(S): 9 INJECTION, SOLUTION INTRAVENOUS at 05:43

## 2017-08-29 RX ADMIN — Medication 100 MILLIGRAM(S): at 13:54

## 2017-08-29 NOTE — PROGRESS NOTE ADULT - SUBJECTIVE AND OBJECTIVE BOX
Patient is a 61y old  Male who presents with a chief complaint of Right elbow pain + swelling (27 Aug 2017 22:28)    PATIENT IS SEEN AND EXAMINED IN MEDICAL FLOOR.    ALLERGIES:  penicillin (Unknown)      VITALS:    Vital Signs Last 24 Hrs  T(C): 36.6 (29 Aug 2017 14:40), Max: 37.2 (28 Aug 2017 22:24)  T(F): 97.9 (29 Aug 2017 14:40), Max: 99 (28 Aug 2017 22:24)  HR: 62 (29 Aug 2017 14:40) (62 - 72)  BP: 139/75 (29 Aug 2017 14:40) (139/75 - 165/65)  BP(mean): --  RR: 17 (29 Aug 2017 14:40) (17 - 18)  SpO2: 100% (29 Aug 2017 14:40) (93% - 100%)    LABS:  CBC Full  -  ( 28 Aug 2017 11:06 )  WBC Count : 9.2 K/uL  Hemoglobin : 14.0 g/dL  Hematocrit : 43.6 %  Platelet Count - Automated : 172 K/uL  Mean Cell Volume : 91.3 fl  Mean Cell Hemoglobin : 29.4 pg  Mean Cell Hemoglobin Concentration : 32.2 gm/dL  Auto Neutrophil # : x  Auto Lymphocyte # : x  Auto Monocyte # : x  Auto Eosinophil # : x  Auto Basophil # : x  Auto Neutrophil % : x  Auto Lymphocyte % : x  Auto Monocyte % : x  Auto Eosinophil % : x  Auto Basophil % : x    PT/INR - ( 27 Aug 2017 18:36 )   PT: 12.4 sec;   INR: 1.13 ratio         PTT - ( 27 Aug 2017 18:36 )  PTT:30.9 sec  08-28    143  |  110<H>  |  11  ----------------------------<  99  4.0   |  25  |  1.03    Ca    8.9      28 Aug 2017 11:06  Phos  2.9     08-28  Mg     2.3     08-28        MEDICATIONS:    MEDICATIONS  (STANDING):  lactated ringers. 1000 milliLiter(s) (60 mL/Hr) IV Continuous <Continuous>  atorvastatin 80 milliGRAM(s) Oral at bedtime  metoprolol succinate ER 25 milliGRAM(s) Oral daily  losartan 75 milliGRAM(s) Oral at bedtime  enoxaparin Injectable 40 milliGRAM(s) SubCutaneous every 24 hours  vancomycin  IVPB 1250 milliGRAM(s) IV Intermittent every 12 hours      MEDICATIONS  (PRN):      REVIEW OF SYSTEMS:                           ALL ROS DONE [ X   ]    CONSTITUTIONAL:  LETHARGIC [   ], FEVER [   ], UNRESPONSIVE [   ]  CVS:  CP  [   ], SOB, [   ], PALPITATIONS [   ], DIZZYNESS [   ]  RS: COUGH [   ], SPUTUM [   ]  GI: ABDOMINAL PAIN [   ], NAUSEA [   ], VOMITINGS [   ], DIARRHEA [   ], CONSTIPATION [   ]  :  DYSURIA [   ], NOCTURIA [   ], INCREASED FREQUENCY [   ], DRIBLING [   ],  SKELETAL: PAINFUL JOINTS [   ], SWOLLEN JOINTS [   ], NECK ACHE [   ], LOW BACK ACHE [   ],  SKIN : ULCERS [   ], RASH [   ], ITCHING [   ]  CNS: HEAD ACHE [   ], DOUBLE VISION [   ], BLURRED VISION [   ], AMS / CONFUSION [   ], SEIZURES [   ], SEIZURES [   ], WEAKNESS [   ],TINGLING / NUMBNESS [   ],    PHYSICAL EXAMINATION:  GENERAL APPEARANCE: NO DISTRESS  HEENT:  NO PALLOR, NO  JVD,  NO   NODES, NECK SUPPLE  CVS: S1 +, S2 +,   RS: AEEB,  OCCASIONAL  RALES +,   NO RONCHI  ABD: SOFT, NT, NO, BS +  EXT: NO PE. RIGHT FORE ARM IS IN DRESSING AND IMMOBILISER.   SKIN: WARM,   SKELETAL:  ROM ACCEPTABLE  CNS:  AAO X  3  ,  no DEFICITS    RADIOLOGY :      ASSESSMENT :     Cutaneous abscess  HTN (hypertension)  CAD (coronary artery disease)  No pertinent past medical history  Stented coronary artery      PLAN:  - RIGHT UPPER ARM CELLULITIS, RIGHT OLECRANON BURSITIS AND ABSCESS - S/P I & D, S/P EXCISION OF OLECRANON BURSA. ON IV VANCO. D/W ID. - WILL PLACE PATIENT ON PO DOXYCYCLINE AND DC IN AM TO HOME. PAIN Rx.  - GI AND DVT PROPHYLAXIS  -

## 2017-08-29 NOTE — PROGRESS NOTE ADULT - SUBJECTIVE AND OBJECTIVE BOX
60 y/o M s/p I&D of MAGDALENO murphy POD#2. Pt notes improvement in his condition. Denies any fever/chills/numbness/tinlging/CP/SOB. Pt denies any overnight events.       PAST MEDICAL & SURGICAL HISTORY:  HTN (hypertension)  CAD (coronary artery disease)  Stented coronary artery      Allergies: penicillin (Unknown)      Medications: lactated ringers. 1000 milliLiter(s) IV Continuous <Continuous>  atorvastatin 80 milliGRAM(s) Oral at bedtime  metoprolol succinate ER 25 milliGRAM(s) Oral daily  losartan 75 milliGRAM(s) Oral at bedtime  enoxaparin Injectable 40 milliGRAM(s) SubCutaneous every 24 hours  vancomycin  IVPB 1250 milliGRAM(s) IV Intermittent every 12 hours      Vital Signs Last 24 Hrs  T(C): 36.6 (08-29-17 @ 14:40), Max: 37.2 (08-28-17 @ 22:24)  T(F): 97.9 (08-29-17 @ 14:40), Max: 99 (08-28-17 @ 22:24)  HR: 62 (08-29-17 @ 14:40) (62 - 72)  BP: 139/75 (08-29-17 @ 14:40) (139/75 - 165/65)  BP(mean): --  RR: 17 (08-29-17 @ 14:40) (17 - 18)  SpO2: 100% (08-29-17 @ 14:40) (93% - 100%)  Daily     Daily       Physical Exam  Gen: NAD  RUE: In posterior splint and elevation pillow- intact, Dressing- C/D/I. Dressing changed today- Sutures intact, penrose intact- no acive drainage- mild erythema(improved) over elbow- significant improvement of swelling and erythema from pre-op.   Moves all fingers and wrist without pain, BCR, SILT.                             14.0   9.2   )-----------( 172      ( 28 Aug 2017 11:06 )             43.6       08-28    143  |  110<H>  |  11  ----------------------------<  99  4.0   |  25  |  1.03    Ca    8.9      28 Aug 2017 11:06  Phos  2.9     08-28  Mg     2.3     08-28    TPro  7.6  /  Alb  3.5  /  TBili  0.5  /  DBili  x   /  AST  21  /  ALT  35  /  AlkPhos  79  08-27    Gram stain: No organism seen and (+) moderate leukocytes seen    A/P: 61y Male S/p I&D of R Olecranon POD#2  - Seen with - Dressing changed- Drain left in, will change dressing tomorrow and possibly D/c pt home tomorrow w/antibx  -Pain control  -NWB RUE in splint, keep c/d/I  -Ice/elevation pillow  -Active movement of fingers encouraged  - Incentive spirometer  -C/w Antibx  -F/u final intraop cultures  -D/w

## 2017-08-29 NOTE — PROGRESS NOTE ADULT - SUBJECTIVE AND OBJECTIVE BOX
61y Male    Meds:  aztreonam  IVPB   IV Intermittent   aztreonam  IVPB 2000 milliGRAM(s) IV Intermittent every 8 hours  vancomycin  IVPB 1250 milliGRAM(s) IV Intermittent every 12 hours    Allergies    penicillin (Unknown)    Intolerances        VITALS:  Vital Signs Last 24 Hrs  T(C): 36.4 (29 Aug 2017 05:05), Max: 37.2 (28 Aug 2017 22:24)  T(F): 97.5 (29 Aug 2017 05:05), Max: 99 (28 Aug 2017 22:24)  HR: 67 (29 Aug 2017 05:05) (67 - 72)  BP: 161/73 (29 Aug 2017 05:05) (136/62 - 165/65)  BP(mean): --  RR: 17 (29 Aug 2017 05:05) (16 - 18)  SpO2: 93% (29 Aug 2017 05:05) (93% - 100%)    LABS/DIAGNOSTIC TESTS:                          14.0   9.2   )-----------( 172      ( 28 Aug 2017 11:06 )             43.6         08-28    143  |  110<H>  |  11  ----------------------------<  99  4.0   |  25  |  1.03    Ca    8.9      28 Aug 2017 11:06  Phos  2.9     08-28  Mg     2.3     08-28    TPro  7.6  /  Alb  3.5  /  TBili  0.5  /  DBili  x   /  AST  21  /  ALT  35  /  AlkPhos  79  08-27      LIVER FUNCTIONS - ( 27 Aug 2017 17:57 )  Alb: 3.5 g/dL / Pro: 7.6 g/dL / ALK PHOS: 79 U/L / ALT: 35 U/L DA / AST: 21 U/L / GGT: x             CULTURES: Culture - Tissue with Gram Stain (08.28.17 @ 11:27)    Gram Stain:   Moderate polymorphonuclear leukocytes per low power field  No organisms seen per oil power field    Specimen Source: .Tissue Other, right olecranon bursa    Culture Results:   No growth to date.    Culture - Surgical Swab (08.28.17 @ 11:27)    Specimen Source: .Surgical Swab right olecranon abcess    Culture Results:   No growth to date.    Culture - Surgical Swab (08.28.17 @ 11:27)    Specimen Source: .Surgical Swab right olecranon abcess    Culture Results:   No growth to date.                  RADIOLOGY:      ROS:  [  ] UNABLE TO ELICIT 61y Male who is doing well clinically, pts arm seen today with ortho and it looks great, his redness is gone except at the elbow region , swelling is mostly gone, no significant pain in arm. no fevers or chills. cultures remain neg to date.    Meds:  aztreonam  IVPB   IV Intermittent   aztreonam  IVPB 2000 milliGRAM(s) IV Intermittent every 8 hours  vancomycin  IVPB 1250 milliGRAM(s) IV Intermittent every 12 hours    Allergies    penicillin (Unknown)    Intolerances        VITALS:  Vital Signs Last 24 Hrs  T(C): 36.4 (29 Aug 2017 05:05), Max: 37.2 (28 Aug 2017 22:24)  T(F): 97.5 (29 Aug 2017 05:05), Max: 99 (28 Aug 2017 22:24)  HR: 67 (29 Aug 2017 05:05) (67 - 72)  BP: 161/73 (29 Aug 2017 05:05) (136/62 - 165/65)  BP(mean): --  RR: 17 (29 Aug 2017 05:05) (16 - 18)  SpO2: 93% (29 Aug 2017 05:05) (93% - 100%)    LABS/DIAGNOSTIC TESTS:                          14.0   9.2   )-----------( 172      ( 28 Aug 2017 11:06 )             43.6         08-28    143  |  110<H>  |  11  ----------------------------<  99  4.0   |  25  |  1.03    Ca    8.9      28 Aug 2017 11:06  Phos  2.9     08-28  Mg     2.3     08-28    TPro  7.6  /  Alb  3.5  /  TBili  0.5  /  DBili  x   /  AST  21  /  ALT  35  /  AlkPhos  79  08-27      LIVER FUNCTIONS - ( 27 Aug 2017 17:57 )  Alb: 3.5 g/dL / Pro: 7.6 g/dL / ALK PHOS: 79 U/L / ALT: 35 U/L DA / AST: 21 U/L / GGT: x             CULTURES: Culture - Tissue with Gram Stain (08.28.17 @ 11:27)    Gram Stain:   Moderate polymorphonuclear leukocytes per low power field  No organisms seen per oil power field    Specimen Source: .Tissue Other, right olecranon bursa    Culture Results:   No growth to date.    Culture - Surgical Swab (08.28.17 @ 11:27)    Specimen Source: .Surgical Swab right olecranon abcess    Culture Results:   No growth to date.    Culture - Surgical Swab (08.28.17 @ 11:27)    Specimen Source: .Surgical Swab right olecranon abcess    Culture Results:   No growth to date.                  RADIOLOGY:      ROS:  [  ] UNABLE TO ELICIT

## 2017-08-30 VITALS
SYSTOLIC BLOOD PRESSURE: 149 MMHG | OXYGEN SATURATION: 100 % | DIASTOLIC BLOOD PRESSURE: 74 MMHG | HEART RATE: 64 BPM | TEMPERATURE: 98 F | RESPIRATION RATE: 16 BRPM

## 2017-08-30 PROCEDURE — 86140 C-REACTIVE PROTEIN: CPT

## 2017-08-30 PROCEDURE — 83036 HEMOGLOBIN GLYCOSYLATED A1C: CPT

## 2017-08-30 PROCEDURE — 85730 THROMBOPLASTIN TIME PARTIAL: CPT

## 2017-08-30 PROCEDURE — 85652 RBC SED RATE AUTOMATED: CPT

## 2017-08-30 PROCEDURE — 82746 ASSAY OF FOLIC ACID SERUM: CPT

## 2017-08-30 PROCEDURE — 73221 MRI JOINT UPR EXTREM W/O DYE: CPT

## 2017-08-30 PROCEDURE — 71045 X-RAY EXAM CHEST 1 VIEW: CPT

## 2017-08-30 PROCEDURE — 87070 CULTURE OTHR SPECIMN AEROBIC: CPT

## 2017-08-30 PROCEDURE — 86900 BLOOD TYPING SEROLOGIC ABO: CPT

## 2017-08-30 PROCEDURE — 99285 EMERGENCY DEPT VISIT HI MDM: CPT | Mod: 25

## 2017-08-30 PROCEDURE — 86850 RBC ANTIBODY SCREEN: CPT

## 2017-08-30 PROCEDURE — 93005 ELECTROCARDIOGRAM TRACING: CPT

## 2017-08-30 PROCEDURE — 83605 ASSAY OF LACTIC ACID: CPT

## 2017-08-30 PROCEDURE — 84100 ASSAY OF PHOSPHORUS: CPT

## 2017-08-30 PROCEDURE — 96374 THER/PROPH/DIAG INJ IV PUSH: CPT | Mod: 59

## 2017-08-30 PROCEDURE — 82607 VITAMIN B-12: CPT

## 2017-08-30 PROCEDURE — 84443 ASSAY THYROID STIM HORMONE: CPT

## 2017-08-30 PROCEDURE — 87040 BLOOD CULTURE FOR BACTERIA: CPT

## 2017-08-30 PROCEDURE — 36415 COLL VENOUS BLD VENIPUNCTURE: CPT

## 2017-08-30 PROCEDURE — 85610 PROTHROMBIN TIME: CPT

## 2017-08-30 PROCEDURE — 80061 LIPID PANEL: CPT

## 2017-08-30 PROCEDURE — 80048 BASIC METABOLIC PNL TOTAL CA: CPT

## 2017-08-30 PROCEDURE — 83735 ASSAY OF MAGNESIUM: CPT

## 2017-08-30 PROCEDURE — 85027 COMPLETE CBC AUTOMATED: CPT

## 2017-08-30 PROCEDURE — 80053 COMPREHEN METABOLIC PANEL: CPT

## 2017-08-30 PROCEDURE — 86901 BLOOD TYPING SEROLOGIC RH(D): CPT

## 2017-08-30 RX ADMIN — Medication 166.67 MILLIGRAM(S): at 05:29

## 2017-08-30 RX ADMIN — ENOXAPARIN SODIUM 40 MILLIGRAM(S): 100 INJECTION SUBCUTANEOUS at 05:29

## 2017-08-30 RX ADMIN — Medication 25 MILLIGRAM(S): at 05:29

## 2017-08-30 NOTE — PROGRESS NOTE ADULT - ASSESSMENT
1.	Right elbow bursitis - s/p I&D   2.	Right arm cellulitis - almost resolved  ·	dc planning today  ·	dc on doxycycline 100mgs po bid x 3 weeks  ·	reconsult prn
61y Male S/p I&D of R Olemónicaanon POD#0
61y Male S/p I&D of R Patricia POD#2
right arm cellulitis - almost resolved  right elbow bursitis - s/p I&D and removal of bursa  plan - cont vanco 1.25gms iv q12 hrs  dc azactam  plan to dc home to tomorrow on doxycycline 100mgs po bid x 3 weeks.

## 2017-08-30 NOTE — DISCHARGE NOTE ADULT - MEDICATION SUMMARY - MEDICATIONS TO STOP TAKING
I will STOP taking the medications listed below when I get home from the hospital:    doxycycline hyclate 100 mg oral capsule  -- 1 cap(s) by mouth 2 times a day  -- Avoid prolonged or excessive exposure to direct and/or artificial sunlight while taking this medication.  Do not take this drug if you are pregnant.  Finish all this medication unless otherwise directed by prescriber.  Medication should be taken with plenty of water.

## 2017-08-30 NOTE — PROGRESS NOTE ADULT - SUBJECTIVE AND OBJECTIVE BOX
Orthopedics:    Diagnosis: S/p I&D of R Olecranon POD#3    Pt seen and evaluated.  Pain is improving of L elbow. Pain is 2/10 at rest. L elbow in posterior splint.   Seen by ID yesterday who recommended possible d/c home today if improvement is noted.   Pt denies Chest pain, SOB, dyspnea, paresthesias, N/V/D, abdominal pain, syncope, or pain anywhere else.    Vital Signs Last 24 Hrs  T(C): 36.7 (30 Aug 2017 05:15), Max: 36.7 (30 Aug 2017 05:10)  T(F): 98.1 (30 Aug 2017 05:15), Max: 98.1 (30 Aug 2017 05:10)  HR: 58 (30 Aug 2017 05:15) (56 - 62)  BP: 113/74 (30 Aug 2017 05:15) (113/74 - 149/71)  BP(mean): --  RR: 16 (30 Aug 2017 05:15) (16 - 17)  SpO2: 98% (30 Aug 2017 05:15) (98% - 100%)    Physical exam:  General: AAOx3. NAD. stable  L Elbow: Posterior splint intact wrapped in ACE bandage. Splint removed and dressing was changed today. Skin was pink and warm. Very minimal erythema over the olecranon with improvement from yesterday. Swelling noted over he left elbow with slight improvement. Penrose drain was removed. 4x4 gauze with tiny serous stain.  Wound is clean, dry with no drainage. SILT. stitches intact. Rad/uln/med nerves intact. 2+pulses. 5/5  strength. compartments soft.     Labs:   Culture - Tissue with Gram Stain (08.28.17 @ 11:27)    Gram Stain:   Moderate polymorphonuclear leukocytes per low power field  No organisms seen per oil power field    Specimen Source: .Tissue Other, right olecranon bursa    Culture Results:   No growth to date.  Culture - Surgical Swab (08.28.17 @ 11:27)    Specimen Source: .Surgical Swab right olecranon abcess    Culture Results:   No growth to date.    Impression: 60 y/o M S/p I&D of R Olecranon POD#3    Plan:  - Pain management  - DVT ppx with venodynes  - Dressing changed today  - Dr Gracie (ID) to assess pt today before possible discharge today.   - ID recommendations: Doxycycline 100mg po bid x 21 days  - d/c home today with dry dressing.   - case d/w dr solo.

## 2017-08-30 NOTE — DISCHARGE NOTE ADULT - CARE PLAN
Principal Discharge DX:	Abscess  Goal:	S/p I&D of MAGDALENO murphy on 8/27/17  Instructions for follow-up, activity and diet:	- Pain control w/percocet  - C/w antibiotics-Doxycyline- 100mg BID x 21 days  - Incentive spirometer  - Keep dressing clean/dry/intact  - Keep right arm elevated, NON WEIGHT BEARING TO THE RIGHT ARM UNTIL CLEARED BY .   - Follow-up with Dr. Zhang in ONE WEEK at [phone] for wound check  Secondary Diagnosis:	CAD (coronary artery disease)  Goal:	C/w home meds  Secondary Diagnosis:	HTN (hypertension)  Goal:	C/w home meds Principal Discharge DX:	Abscess  Goal:	S/p I&D of MAGDALENO murphy on 8/27/17  Instructions for follow-up, activity and diet:	- Pain control w/percocet  - C/w antibiotics-Doxycyline- 100mg BID x 21 days  - Incentive spirometer  - Keep dressing clean/dry/intact  - Keep right arm elevated, NON WEIGHT BEARING TO THE RIGHT ARM UNTIL CLEARED BY .   - Follow-up with Dr. Zhang in ONE WEEK at [phone] for wound check  Secondary Diagnosis:	CAD (coronary artery disease)  Goal:	C/w home meds  Secondary Diagnosis:	HTN (hypertension)  Goal:	C/w home meds

## 2017-08-30 NOTE — PROGRESS NOTE ADULT - SUBJECTIVE AND OBJECTIVE BOX
61y Male is under our care for right arm cellulitis with right elbow bursitis s/p I&D.  Patient continues to do well; presentation of right elbow looks better as per ortho.  Has not had any fevers and wbc count has been normal. Due for dc home today on po antibiotics.    REVIEW OF SYSTEMS:  [  ] Not able to illicit  General: no fevers no malaise	  Chest: no cough no sob  GI: no nvd  Skin: no pruritus  Musculoskeletal: +right elbow pain - improving  Neuro: no ha's no dizziness    MEDS:  vancomycin  IVPB 1250 milliGRAM(s) IV Intermittent every 12 hours    ALLERGIES: penicillin (Unknown)    VITALS:  Vital Signs Last 24 Hrs  T(C): 36.7 (30 Aug 2017 05:15), Max: 36.7 (30 Aug 2017 05:10)  T(F): 98.1 (30 Aug 2017 05:15), Max: 98.1 (30 Aug 2017 05:10)  HR: 58 (30 Aug 2017 05:15) (56 - 62)  BP: 113/74 (30 Aug 2017 05:15) (113/74 - 149/71)  BP(mean): --  RR: 16 (30 Aug 2017 05:15) (16 - 17)  SpO2: 98% (30 Aug 2017 05:15) (98% - 100%)      PHYSICAL EXAM:  HEENT: n/a  Neck: supple no LN's   Respiratory: lungs clear no rales  Cardiovascular: S1 S2 reg no murmurs  Gastrointestinal: +BS with soft, nontender abdomen; nondistended  Extremities: mild right elbow edema  Skin: decreased erythema of right elbow, clean wound; resolved cellulitis of right arm  Ortho: AROM of right elbow to 90 degrees  Neuro: AAO x 3    LABS/DIAGNOSTIC TESTS:  No new labs    CULTURES:   Culture - Tissue with Gram Stain (08.28.17 @ 11:27)    Gram Stain:   Moderate polymorphonuclear leukocytes per low power field  No organisms seen per oil power field    Specimen Source: .Tissue Other, right olecranon bursa    Culture Results:   Insufficient growth-culture reincubated    Culture - Blood (08.27.17 @ 21:44)    Specimen Source: .Blood Blood    Culture Results:   No growth to date.    Culture - Blood (08.27.17 @ 21:44)    Specimen Source: .Blood Blood    Culture Results:   No growth to date.    RADIOLOGY:  no new studies

## 2017-08-30 NOTE — DISCHARGE NOTE ADULT - CARE PROVIDER_API CALL
Frank Zhang), Orthopaedic Surgery  [address] [place] [address]  Phone: [phone]  Fax: [phone]

## 2017-08-30 NOTE — DISCHARGE NOTE ADULT - NS AS ACTIVITY OBS
Do not drive or operate machinery/No Heavy lifting/straining/Keep right arm dressing clean/dry and intact

## 2017-08-30 NOTE — DISCHARGE NOTE ADULT - PATIENT PORTAL LINK FT
“You can access the [facility] Patient Portal, offered by [facility], by registering with the following website: [email or web address]”

## 2017-08-30 NOTE — DISCHARGE NOTE ADULT - HOSPITAL COURSE
62 y/o M underwent emergent Right olecranon I&D on 8/27/17- No complications. Pt received daily PT postoperatively. Condition significantly improved- stable for D/c home.

## 2017-08-30 NOTE — DISCHARGE NOTE ADULT - MEDICATION SUMMARY - MEDICATIONS TO TAKE
I will START or STAY ON the medications listed below when I get home from the hospital:    LOSARTAN POTASSIUM 25 MG TAB  -- Indication: For HTN (hypertension)    LOSARTAN POTASSIUM 50 MG TAB  -- Indication: For HTN (hypertension)    CRESTOR 20 MG TABLET  -- Indication: For Hyperlipidemia    doxycycline monohydrate 100 mg oral tablet  -- 1 tab(s) by mouth 2 times a day  -- Avoid prolonged or excessive exposure to direct and/or artificial sunlight while taking this medication.  Do not take this drug if you are pregnant.  Finish all this medication unless otherwise directed by prescriber.  Medication should be taken with plenty of water.    -- Indication: For Antibiotics    METOPROLOL SUCC ER 25 MG TAB  -- Indication: For HTN (hypertension)

## 2017-08-30 NOTE — DISCHARGE NOTE ADULT - PLAN OF CARE
S/p I&D of MAGDALENO murphy on 8/27/17 - Pain control w/percocet  - C/w antibiotics-Doxycyline- 100mg BID x 21 days  - Incentive spirometer  - Keep dressing clean/dry/intact  - Keep right arm elevated, NON WEIGHT BEARING TO THE RIGHT ARM UNTIL CLEARED BY .   - Follow-up with Dr. Zhang in ONE WEEK at [phone] for wound check C/w home meds

## 2017-09-01 DIAGNOSIS — M71.521 OTHER BURSITIS, NOT ELSEWHERE CLASSIFIED, RIGHT ELBOW: ICD-10-CM

## 2017-09-01 DIAGNOSIS — R79.89 OTHER SPECIFIED ABNORMAL FINDINGS OF BLOOD CHEMISTRY: ICD-10-CM

## 2017-09-01 DIAGNOSIS — N17.9 ACUTE KIDNEY FAILURE, UNSPECIFIED: ICD-10-CM

## 2017-09-01 DIAGNOSIS — Z88.0 ALLERGY STATUS TO PENICILLIN: ICD-10-CM

## 2017-09-01 DIAGNOSIS — I25.10 ATHEROSCLEROTIC HEART DISEASE OF NATIVE CORONARY ARTERY WITHOUT ANGINA PECTORIS: ICD-10-CM
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
## 2017-09-01 DIAGNOSIS — Z79.82 LONG TERM (CURRENT) USE OF ASPIRIN: ICD-10-CM
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
## 2017-09-01 DIAGNOSIS — L03.113 CELLULITIS OF RIGHT UPPER LIMB: ICD-10-CM

## 2017-09-01 DIAGNOSIS — M71.021 ABSCESS OF BURSA, RIGHT ELBOW: ICD-10-CM

## 2017-09-01 DIAGNOSIS — Z95.5 PRESENCE OF CORONARY ANGIOPLASTY IMPLANT AND GRAFT: ICD-10-CM

## 2017-09-01 DIAGNOSIS — E78.5 HYPERLIPIDEMIA, UNSPECIFIED: ICD-10-CM

## 2017-09-01 DIAGNOSIS — Z90.49 ACQUIRED ABSENCE OF OTHER SPECIFIED PARTS OF DIGESTIVE TRACT: ICD-10-CM

## 2017-09-01 DIAGNOSIS — I10 ESSENTIAL (PRIMARY) HYPERTENSION: ICD-10-CM
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
## 2017-09-01 LAB
CULTURE RESULTS: SIGNIFICANT CHANGE UP
SPECIMEN SOURCE: SIGNIFICANT CHANGE UP

## 2017-09-01 NOTE — CHART NOTE - NSCHARTNOTEFT_GEN_A_CORE
Orthopedics called by [facility] lab to discuss positive lab result for patient. Tissue culture from 8/28/17 during surgical I&D of elbow came back positive for streptococcus intermedius. Discussed tissue with Dr. Bowman who recommends change of antibiotic to Levaquin 750mg by mouth for 3 weeks starting today. I had called patient at [phone] and discussed with patient that change of antibiotic would take in effect today. Prescription for antibiotic was sent to pharmacy. Patient verbalizes understanding of change and will  medication today to start. He will follow up with Dr. Zhang at his scheduled post-op visit.

## 2017-09-02 LAB
CULTURE RESULTS: SIGNIFICANT CHANGE UP
SPECIMEN SOURCE: SIGNIFICANT CHANGE UP

## 2018-07-03 NOTE — ED ADULT NURSE NOTE - CCCP TRG CHIEF CMPLNT
88 yo F with hx of HTN, previous TIAs, on plavix, presents for evaluation of confusion, onset today at 1250pm, associated with slurred speech. No fever, no chills, no headache, no nausea, no vomiting, no chest pain, no back pain, no abdominal pain. Patient was with her daughter, when she began to be confused and she was having slurred speech. Per family, this has occurred in the past and which she was worked up for. Patient is alert in the ED. worsening r.elbow wound

## 2018-11-02 NOTE — DISCHARGE NOTE ADULT - PROCEDURE 1
Addended by: CHARLENE KENYON on: 11/2/2018 02:22 PM     Modules accepted: Orders     Olecranon bursitis of right elbow

## 2019-02-17 NOTE — ED PROVIDER NOTE - PROGRESS NOTE DETAILS
Regular rate & rhythm, normal S1, S2; no murmurs, gallops or rubs; no S3, S4 Regular rate & rhythm, normal S1, S2; no murmurs, gallops or rubs; no S3, S4 pt abscess drained at bedside by resident, pt stable. d/c with doxy. f/u with pmd. pt stable for d/c.

## 2019-08-06 NOTE — ED ADULT TRIAGE NOTE - CHIEF COMPLAINT QUOTE
Any Depression?: No
Hypercholesterolemia Monitoring: I explained this is common when taking isotretinoin. We will monitor closely.
Worsening wound on r.elbow,started on po antibiotic yesterday. States" i had fever yesterday".
Comments: Acne monitoring if new outbreaks noted pending if improved if outbreaks noted and minimal improvement may increase to 100mg or consider additional month. Back acne has improved greatly from last month.
Retinoid Dermatitis Normal Treatment: I recommended more frequent application of Cetaphil or CeraVe to the areas of dermatitis.
Patient Weight (Optional But Required For Cumulative Dose-Numbers And Decimals Only): 140
Dosing Month 4 (Required For Cumulative Dosing): 50mg BID
Retinoid Dermatitis Aggressive Treatment: I recommended more frequent application of Cetaphil or CeraVe to the areas of dermatitis. I also prescribed a topical steroid for twice daily use until the dermatitis resolves.
Cheilitis Normal Treatment: I recommended application of Vaseline or Aquaphor numerous times a day (as often as every hour) and before going to bed.
Myalgia Monitoring: I explained this is common when taking isotretinoin. If this worsens they will contact us.
Dosing Month 1 (Required For Cumulative Dosing): 30mg BID
Male Completion Statement: After discussing his treatment course we decided to discontinue isotretinoin therapy at this time. He shouldn't donate blood for one month after the last dose. He should call with any new symptoms of depression.
Pounds Preamble Statement (Weight Entered In Details Tab): Reported Weight in pounds:
Are Labs Available For Review?: Yes
Is Cheilitis Present?: Yes - Normal Treatment
Cheilitis Aggressive Treatment: I recommended application of Vaseline or Aquaphor numerous times a day (as often as every hour) and before going to bed. I also prescribed a topical steroid for twice daily use.
Weight Units: pounds
Female Completion Statement: After discussing her treatment course we decided to discontinue isotretinoin therapy at this time. I explained that she would need to continue her birth control methods for at least one month after the last dosage. She should also get a pregnancy test one month after the last dose. She shouldn't donate blood for one month after the last dose. She should call with any new symptoms of depression.
Target Cumulative Dosage (In Mg/Kg): 135
Headache Monitoring: I recommended monitoring the headaches for now. There is no evidence of increased intracranial pressure. They were instructed to call if the headaches are worsening.
Female Pregnancy Counseling Text: Female patients should also be on two forms of birth control while taking this medication and for one month after their last dose.
Dosing Month 3 (Required For Cumulative Dosing): 40mg BID
Nosebleeds Normal Treatment: I explained this is common when taking isotretinoin. I recommended saline mist in each nostril multiple times a day. If this worsens they will contact us.
Lower Range (In Mg/Kg): 120
Months Of Therapy Completed: 4
Myalgia Treatment: I explained this is common when taking isotretinoin. If this worsens they will contact us. They may try OTC ibuprofen.
Upper Range (In Mg/Kg): 150
What Is The Patient's Gender: Male
Xerosis Normal Treatment: I recommended application of Cetaphil or CeraVe numerous times a day and before going to bed to all dry areas.
Kilograms Preamble Statement (Weight Entered In Details Tab): Reported Weight in kilograms:
Counseling Text: I reviewed the side effect in detail. Patient should get monthly blood tests, not donate blood, not drive at night if vision affected, and not share medication.
Detail Level: Zone
Xerosis Normal Treatment: I recommended application of Cetaphil or CeraVe numerous times a day going to bed to all dry areas.
Xerosis Aggressive Treatment: I recommended application of Cetaphil or CeraVe numerous times a day going to bed to all dry areas. I also prescribed a topical steroid for twice daily use.
Xerosis Aggressive Treatment: I recommended application of Cetaphil or CeraVe numerous times a day and before going to bed to all dry areas. I also prescribed a topical steroid for twice daily use.

## 2020-07-10 NOTE — CONSULT NOTE ADULT - RESPIRATORY
MD spoke with patient and wife.   Breath Sounds equal & clear to percussion & auscultation, no accessory muscle use

## 2022-02-11 NOTE — ED PROCEDURE NOTE - ATTENDING CONTRIBUTION TO CARE
Impression: Cortical age-related cataract, bilateral: H25.013. Plan: Mild. Cataracts account for the patient's complaints. No treatment currently recommended. The patient will monitor vision changes and contact us with any decrease in vision. I performed a face to face bedside interview with patient regarding history of present illness, review of symptoms and past medical history. I completed an independent physical exam.  I have discussed patient's plan of care.   I agree with note as stated above, having amended the EMR as needed to reflect my findings. I have discussed the assessment and plan of care.  This includes during the time I functioned as the attending physician for this patient.  Attending Contribution to Care: supervised procedure.

## 2022-10-04 NOTE — ED PROVIDER NOTE - NS ED NOTE AC HIGH RISK COUNTRIES
Chief Complaint   Patient presents with   • Pre-Op Exam     Lower back on 10/19/2022 with Dr Zhang        HPI  Here for preop eval at the request of Dr. Jose Zhang.  Scheduled for decompressive lumbar laminectomy L3, L4, L5 operative microscopy at [facility] 10/19/22.    No hx of problems with prior anesthesia.  No hx of abnormal bleeding or abnormal blood clotting problems.  No hx of seizures.  +CAD  +TIA hx.  No hx of lung problems, aside from mild intermittent asthma.  No hx of liver or kidney problems.    Walking limited by back pain.      Review of Systems   Constitutional: Negative.    HENT: Negative.    Eyes: Negative.    Respiratory: Negative.    Cardiovascular: Negative.    Gastrointestinal: Negative.    Endocrine: Negative.    Genitourinary: Negative.    Musculoskeletal: Positive for back pain.   Skin: Negative.    Allergic/Immunologic: Negative.    Neurological: Positive for weakness.        Neg except baseline balance problem  +weakness left arm   Hematological: Negative.    Psychiatric/Behavioral: Negative.        Past Medical History:   Diagnosis Date   • Anxiety    • Arthritis    • Asthma    • BPH (benign prostatic hyperplasia)    • Cholelithiasis    • Coronary artery disease    • Cyst of kidney, acquired    • Depression    • Diabetes mellitus (CMS/[facility])    • Essential (primary) hypertension    • Herniated cervical disc    • High cholesterol    • Kidney stones    • Lumbar herniated disc    • Stroke (CMS/[facility]) May2019    R eye loss of vision   • Tendonitis    • Torn meniscus        Patient Active Problem List   Diagnosis   • BPH (benign prostatic hyperplasia)   • Benign essential hypertension   • Arteriosclerotic heart disease (ASHD)   • Type 2 diabetes mellitus with neurologic complication, without long-term current use of insulin (CMS/[facility])   • Hyperlipidemia   • Major depressive disorder   • Primary osteoarthritis of left hip   • History of stroke without residual deficits   • History of alcoholism (CMS/[facility])    • Vitamin D insufficiency   • Impaired fasting glucose       Past Surgical History:   Procedure Laterality Date   • Back surgery      1 cervical - 3 lumbar   • Cervical discectomy      1980s, anterior approach   • Cholecystectomy  2012 [place]   • Coronary angioplasty with stent placement  1988   • Coronary angioplasty with stent placement      1 stent 1988   • Coronary angioplasty with stent placement  01/28/2020   • Eye surgery Bilateral 2017    cat ext with IOL   • Eye surgery Right 1980's   • Joint replacement Left 12/09/2019    hip   • Knee surgery  2013    Arthroscopic bilat   • Total hip replacement Left        Family History   Problem Relation Age of Onset   • Cancer Mother         cervix uteri malignant   • Cancer, Liver Father    • Myocardial Infarction Father         in his 50s   • Hepatitis C Father    • Cancer, Esophageal Sister    • Parkinsonism Brother          Alcohol Use: No                 Tobacco Use: Never              Drug Use:    No                 Current Outpatient Medications   Medication Sig Dispense Refill   • amLODIPine (NORVASC) 10 MG tablet Take 1 tablet by mouth daily. 90 tablet 1   • atorvastatin (LIPITOR) 80 MG tablet Take 1 tablet by mouth nightly. 90 tablet 0   • finasteride (PROSCAR) 5 MG tablet Take 1 tablet by mouth daily. 90 tablet 1   • hydroCHLOROthiazide (HYDRODIURIL) 25 MG tablet Take 1 tablet by mouth daily. 90 tablet 1   • losartan (COZAAR) 100 MG tablet Take 1 tablet by mouth daily. 90 tablet 0   • PARoxetine (PAXIL) 40 MG tablet Take 1 tablet by mouth daily. 90 tablet 1   • tamsulosin (FLOMAX) 0.4 MG Cap Take 1 capsule by mouth in the morning and 1 capsule in the evening. 180 capsule 1   • metFORMIN (GLUCOPHAGE) 500 MG tablet Take 1 tablet by mouth in the morning and 1 tablet in the evening. Take with meals. 180 tablet 1   • clopidogrel (PLAVIX) 75 MG tablet Take 1 tablet by mouth daily. 90 tablet 11   • albuterol 108 (90 Base) MCG/ACT inhaler Inhale 2 puffs into  the lungs every 4 hours as needed for Shortness of Breath or Wheezing.     • nitroGLYcerin (NITROSTAT) 0.4 MG sublingual tablet Place 0.4 mg under the tongue every 5 minutes as needed for Chest pain.     • Omega-3 Fatty Acids (FISH OIL) 1200 MG capsule Take 1,200 mg by mouth daily.      • Ascorbic Acid (VITAMIN C) 1000 MG tablet Take 1,000 mg by mouth daily.     • Cholecalciferol (VITAMIN D PO) Take 1,000 Units by mouth daily.      • vitamin B-12 (CYANOCOBALAMIN) 1000 MCG tablet Take 1,000 mcg by mouth daily.     • Multiple Vitamins-Minerals (CENTRUM SILVER 50+MEN PO)        No current facility-administered medications for this visit.       ALLERGIES:   Allergen Reactions   • Ace Inhibitors Cough   • Metoprolol Other (See Comments)     Tired- did not tolerate well       Visit Vitals  /80   Pulse 72   Temp 97.9 °F (36.6 °C) (Temporal)   Resp 16   Ht 5' 11\" (1.803 m)   Wt 95.4 kg (210 lb 5.1 oz)   SpO2 97%   BMI 29.33 kg/m²       Physical Exam  Constitutional:       Appearance: Normal appearance.   HENT:      Head: Normocephalic.      Right Ear: Tympanic membrane, ear canal and external ear normal.      Left Ear: Tympanic membrane, ear canal and external ear normal.      Nose: Nose normal. No rhinorrhea.      Mouth/Throat:      Mouth: Mucous membranes are moist.      Pharynx: Oropharynx is clear.   Eyes:      Extraocular Movements: Extraocular movements intact.      Conjunctiva/sclera: Conjunctivae normal.      Pupils: Pupils are equal, round, and reactive to light.   Cardiovascular:      Rate and Rhythm: Normal rate and regular rhythm.      Heart sounds: Normal heart sounds. No murmur heard.  Pulmonary:      Effort: Pulmonary effort is normal.      Breath sounds: Normal breath sounds.   Abdominal:      Palpations: Abdomen is soft. There is no mass.      Tenderness: There is no abdominal tenderness.   Musculoskeletal:      Cervical back: Neck supple.      Right lower leg: No edema.      Left lower leg: No edema.    Lymphadenopathy:      Cervical: No cervical adenopathy.   Skin:     General: Skin is warm.   Neurological:      General: No focal deficit present.      Mental Status: He is alert.      Cranial Nerves: No cranial nerve deficit.      Motor: No weakness.      Coordination: Coordination normal.      Gait: Gait normal.   Psychiatric:         Mood and Affect: Mood normal.         Behavior: Behavior normal.         Thought Content: Thought content normal.         Judgment: Judgment normal.         Assessment and Plan  Peña was seen today for pre-op exam.    Diagnoses and all orders for this visit:    Preop examination  -     GLYCOHEMOGLOBIN  -     CBC WITH DIFFERENTIAL  -     BASIC METABOLIC PANEL    Spondylolisthesis of lumbar region    Spinal stenosis, lumbar region, without neurogenic claudication    Lumbar radiculopathy    Needs flu shot  -     INFLUENZA QUADRIVALENT HIGH DOSE PRES FREE 0.7 ML VACC,IM    Arteriosclerotic heart disease (ASHD)  -     CBC WITH DIFFERENTIAL    Benign essential hypertension  -     BASIC METABOLIC PANEL    Mixed hyperlipidemia    Type 2 diabetes mellitus with other neurologic complication, without long-term current use of insulin (CMS/[facility])  -     GLYCOHEMOGLOBIN      Cardiology clearance with Dr. Houston 9/27/22 received and reviewed.  Pt was already instructed to stop plavix 5 days prior to surgery and restart ASAP after surgery when OK with ortho.      Pt optimized for surgery.    Take only amlodipine and hydrochlorothiazide the morning of surgery.  Stop metformin the morning of 10/18/22.    Avoid NSAIDs (advil, ibuprofen, motrin, naproxen) starting one week prior to surgery.     Pt reminded to quarantine after preop covid test until surgery.     No

## 2024-11-25 NOTE — ED PROVIDER NOTE - CARDIAC, MLM
pt seen and examine today see above plan - septic shock 2/2 UTI   #MDRO pseudomonas and ESBL proteus and rhino/enterovirus. Normal rate, regular rhythm.  Heart sounds S1, S2.  No murmurs, rubs or gallops. pt seen and examine today see above plan - septic shock   sepsis  poa sec to  2/2 UTI  MDRO pseudomonas and ESBL proteus and rhino/enterovirus.